# Patient Record
Sex: MALE | Race: WHITE | NOT HISPANIC OR LATINO | Employment: FULL TIME | URBAN - METROPOLITAN AREA
[De-identification: names, ages, dates, MRNs, and addresses within clinical notes are randomized per-mention and may not be internally consistent; named-entity substitution may affect disease eponyms.]

---

## 2018-11-26 ENCOUNTER — HOSPITAL ENCOUNTER (EMERGENCY)
Facility: HOSPITAL | Age: 43
Discharge: HOME | End: 2018-11-26
Attending: EMERGENCY MEDICINE | Admitting: EMERGENCY MEDICINE

## 2018-11-26 VITALS
OXYGEN SATURATION: 97 % | DIASTOLIC BLOOD PRESSURE: 81 MMHG | HEART RATE: 80 BPM | BODY MASS INDEX: 38.3 KG/M2 | TEMPERATURE: 98.1 F | RESPIRATION RATE: 16 BRPM | SYSTOLIC BLOOD PRESSURE: 123 MMHG | WEIGHT: 244 LBS | HEIGHT: 67 IN

## 2018-11-26 DIAGNOSIS — E87.6 HYPOKALEMIA DUE TO LOSS OF POTASSIUM: ICD-10-CM

## 2018-11-26 DIAGNOSIS — K52.9 ACUTE GASTROENTERITIS: Primary | ICD-10-CM

## 2018-11-26 LAB
ALBUMIN SERPL-MCNC: 4.1 G/DL (ref 3.5–5.2)
ALBUMIN/GLOB SERPL: 1.1 G/DL
ALP SERPL-CCNC: 70 U/L (ref 39–117)
ALT SERPL W P-5'-P-CCNC: 23 U/L (ref 1–41)
ANION GAP SERPL CALCULATED.3IONS-SCNC: 12.5 MMOL/L
AST SERPL-CCNC: 23 U/L (ref 1–40)
BACTERIA UR QL AUTO: ABNORMAL /HPF
BASOPHILS # BLD AUTO: 0.03 10*3/MM3 (ref 0–0.2)
BASOPHILS NFR BLD AUTO: 0.5 % (ref 0–1.5)
BILIRUB SERPL-MCNC: 1.4 MG/DL (ref 0.1–1.2)
BILIRUB UR QL STRIP: NEGATIVE
BUN BLD-MCNC: 16 MG/DL (ref 6–20)
BUN/CREAT SERPL: 15.8 (ref 7–25)
CALCIUM SPEC-SCNC: 9.4 MG/DL (ref 8.6–10.5)
CHLORIDE SERPL-SCNC: 98 MMOL/L (ref 98–107)
CLARITY UR: CLEAR
CO2 SERPL-SCNC: 26.5 MMOL/L (ref 22–29)
COLOR UR: ABNORMAL
CREAT BLD-MCNC: 1.01 MG/DL (ref 0.76–1.27)
DEPRECATED RDW RBC AUTO: 45.2 FL (ref 37–54)
EOSINOPHIL # BLD AUTO: 0.1 10*3/MM3 (ref 0–0.7)
EOSINOPHIL NFR BLD AUTO: 1.7 % (ref 0.3–6.2)
ERYTHROCYTE [DISTWIDTH] IN BLOOD BY AUTOMATED COUNT: 13.9 % (ref 11.5–14.5)
GFR SERPL CREATININE-BSD FRML MDRD: 81 ML/MIN/1.73
GLOBULIN UR ELPH-MCNC: 3.7 GM/DL
GLUCOSE BLD-MCNC: 103 MG/DL (ref 65–99)
GLUCOSE UR STRIP-MCNC: NEGATIVE MG/DL
HCT VFR BLD AUTO: 43.9 % (ref 40.4–52.2)
HGB BLD-MCNC: 15 G/DL (ref 13.7–17.6)
HGB UR QL STRIP.AUTO: ABNORMAL
HOLD SPECIMEN: NORMAL
HYALINE CASTS UR QL AUTO: ABNORMAL /LPF
IMM GRANULOCYTES # BLD: 0.02 10*3/MM3 (ref 0–0.03)
IMM GRANULOCYTES NFR BLD: 0.3 % (ref 0–0.5)
KETONES UR QL STRIP: ABNORMAL
LEUKOCYTE ESTERASE UR QL STRIP.AUTO: NEGATIVE
LYMPHOCYTES # BLD AUTO: 1.44 10*3/MM3 (ref 0.9–4.8)
LYMPHOCYTES NFR BLD AUTO: 24.9 % (ref 19.6–45.3)
MCH RBC QN AUTO: 30.2 PG (ref 27–32.7)
MCHC RBC AUTO-ENTMCNC: 34.2 G/DL (ref 32.6–36.4)
MCV RBC AUTO: 88.5 FL (ref 79.8–96.2)
MONOCYTES # BLD AUTO: 0.78 10*3/MM3 (ref 0.2–1.2)
MONOCYTES NFR BLD AUTO: 13.5 % (ref 5–12)
NEUTROPHILS # BLD AUTO: 3.42 10*3/MM3 (ref 1.9–8.1)
NEUTROPHILS NFR BLD AUTO: 59.1 % (ref 42.7–76)
NITRITE UR QL STRIP: NEGATIVE
NRBC BLD MANUAL-RTO: 0 /100 WBC (ref 0–0)
PH UR STRIP.AUTO: 6.5 [PH] (ref 5–8)
PLATELET # BLD AUTO: 194 10*3/MM3 (ref 140–500)
PMV BLD AUTO: 9.5 FL (ref 6–12)
POTASSIUM BLD-SCNC: 3 MMOL/L (ref 3.5–5.2)
PROT SERPL-MCNC: 7.8 G/DL (ref 6–8.5)
PROT UR QL STRIP: ABNORMAL
RBC # BLD AUTO: 4.96 10*6/MM3 (ref 4.6–6)
RBC # UR: ABNORMAL /HPF
REF LAB TEST METHOD: ABNORMAL
SODIUM BLD-SCNC: 137 MMOL/L (ref 136–145)
SP GR UR STRIP: 1.03 (ref 1–1.03)
SQUAMOUS #/AREA URNS HPF: ABNORMAL /HPF
UROBILINOGEN UR QL STRIP: ABNORMAL
WBC NRBC COR # BLD: 5.79 10*3/MM3 (ref 4.5–10.7)
WBC UR QL AUTO: ABNORMAL /HPF
WHOLE BLOOD HOLD SPECIMEN: NORMAL

## 2018-11-26 PROCEDURE — 81001 URINALYSIS AUTO W/SCOPE: CPT | Performed by: NURSE PRACTITIONER

## 2018-11-26 PROCEDURE — 96374 THER/PROPH/DIAG INJ IV PUSH: CPT

## 2018-11-26 PROCEDURE — 99284 EMERGENCY DEPT VISIT MOD MDM: CPT

## 2018-11-26 PROCEDURE — 80053 COMPREHEN METABOLIC PANEL: CPT | Performed by: NURSE PRACTITIONER

## 2018-11-26 PROCEDURE — 85025 COMPLETE CBC W/AUTO DIFF WBC: CPT | Performed by: NURSE PRACTITIONER

## 2018-11-26 PROCEDURE — 96361 HYDRATE IV INFUSION ADD-ON: CPT

## 2018-11-26 PROCEDURE — 25010000002 ONDANSETRON PER 1 MG: Performed by: NURSE PRACTITIONER

## 2018-11-26 RX ORDER — DICYCLOMINE HCL 20 MG
20 TABLET ORAL EVERY 6 HOURS PRN
Qty: 20 TABLET | Refills: 0 | Status: SHIPPED | OUTPATIENT
Start: 2018-11-26

## 2018-11-26 RX ORDER — ONDANSETRON 2 MG/ML
4 INJECTION INTRAMUSCULAR; INTRAVENOUS ONCE
Status: COMPLETED | OUTPATIENT
Start: 2018-11-26 | End: 2018-11-26

## 2018-11-26 RX ORDER — DULOXETIN HYDROCHLORIDE 60 MG/1
60 CAPSULE, DELAYED RELEASE ORAL DAILY
COMMUNITY

## 2018-11-26 RX ORDER — ONDANSETRON 4 MG/1
4 TABLET, ORALLY DISINTEGRATING ORAL EVERY 6 HOURS PRN
Qty: 10 TABLET | Refills: 0 | Status: SHIPPED | OUTPATIENT
Start: 2018-11-26

## 2018-11-26 RX ORDER — HYDROCODONE BITARTRATE AND ACETAMINOPHEN 10; 325 MG/1; MG/1
1 TABLET ORAL EVERY 4 HOURS PRN
COMMUNITY

## 2018-11-26 RX ORDER — POTASSIUM CHLORIDE 750 MG/1
40 CAPSULE, EXTENDED RELEASE ORAL ONCE
Status: COMPLETED | OUTPATIENT
Start: 2018-11-26 | End: 2018-11-26

## 2018-11-26 RX ORDER — CIPROFLOXACIN 500 MG/1
500 TABLET, FILM COATED ORAL 2 TIMES DAILY
Qty: 10 TABLET | Refills: 0 | Status: SHIPPED | OUTPATIENT
Start: 2018-11-26

## 2018-11-26 RX ORDER — OMEPRAZOLE 20 MG/1
20 CAPSULE, DELAYED RELEASE ORAL DAILY
COMMUNITY

## 2018-11-26 RX ORDER — SODIUM CHLORIDE 0.9 % (FLUSH) 0.9 %
10 SYRINGE (ML) INJECTION AS NEEDED
Status: DISCONTINUED | OUTPATIENT
Start: 2018-11-26 | End: 2018-11-26 | Stop reason: HOSPADM

## 2018-11-26 RX ORDER — ZOLPIDEM TARTRATE 10 MG/1
10 TABLET ORAL NIGHTLY PRN
COMMUNITY

## 2018-11-26 RX ADMIN — SODIUM CHLORIDE 1000 ML: 9 INJECTION, SOLUTION INTRAVENOUS at 14:53

## 2018-11-26 RX ADMIN — ONDANSETRON 4 MG: 2 INJECTION INTRAMUSCULAR; INTRAVENOUS at 14:52

## 2018-11-26 RX ADMIN — POTASSIUM CHLORIDE 40 MEQ: 750 CAPSULE, EXTENDED RELEASE ORAL at 15:55

## 2018-11-26 NOTE — DISCHARGE INSTRUCTIONS
Take medications as directed and drink plenty of fluids.  Do follow up with your family doctor.  Please return to the ED if you develop increasing pain, fever or if you develop bloody diarrhea.

## 2018-11-26 NOTE — ED NOTES
"Pt reports that since thanksgiving night he has had abdominal pain and diarrhea. Pt reports for 2 days he had a fever of 102, but denies fever sine then. Pt states he only has abdominal pain right before he has an episode of diarrhea. Abdominal pain is mainly RLQ and right groin. Pt reports frequent bowel movements. He states \"its about every hour and has been since Thursday\". Pt is AAOX4. Vital signs stable this RN placed pt on monitor since pts potassium is 3.0.     Pt also reports he has sarcoid and RA.     Sharmila Patel, RN  11/26/18 0571    "

## 2018-11-26 NOTE — ED PROVIDER NOTES
EMERGENCY DEPARTMENT ENCOUNTER    CHIEF COMPLAINT  Chief Complaint: V/D  History given by: Pt  History limited by: Nothing  Room Number: 11/11  PMD: Rudolph Witt MD      HPI:  Pt is a 43 y.o. male who presents complaining of vomiting and diarrhea (around every hour) which began 5 days ago, which improved over the weekend but worsened today. The pt confirms fever (Tmax 102 2 days ago), mild sore throat, and lower abd cramping (tenesmus), and denies new muscle aches, cough, SOA, earache, weakness, numbness, hematemesis, or black/bloody stool. The pt has not been around anyone sick recently. The pt has not been on antibiotics or steroids recently. The pt's only new medication has been lisinopril.     Duration:  5 days  Onset: Gradual  Timing: Intemittent  Location: GI  Quality: Every hour  Intensity/Severity: Moderate  Progression: No change  Associated Symptoms: Mild sore throat, fever, lower abd cramping  Aggravating Factors: Unknown  Alleviating Factors: Unknown  Treatment before arrival: None    PAST MEDICAL HISTORY  Active Ambulatory Problems     Diagnosis Date Noted   • No Active Ambulatory Problems     Resolved Ambulatory Problems     Diagnosis Date Noted   • No Resolved Ambulatory Problems     No Additional Past Medical History       PAST SURGICAL HISTORY  No past surgical history on file.    FAMILY HISTORY  No family history on file.    SOCIAL HISTORY  Social History     Socioeconomic History   • Marital status:      Spouse name: Not on file   • Number of children: Not on file   • Years of education: Not on file   • Highest education level: Not on file   Social Needs   • Financial resource strain: Not on file   • Food insecurity - worry: Not on file   • Food insecurity - inability: Not on file   • Transportation needs - medical: Not on file   • Transportation needs - non-medical: Not on file   Occupational History   • Not on file   Tobacco Use   • Smoking status: Not on file   Substance and Sexual  Activity   • Alcohol use: Not on file   • Drug use: Not on file   • Sexual activity: Not on file   Other Topics Concern   • Not on file   Social History Narrative   • Not on file       ALLERGIES  Patient has no known allergies.    REVIEW OF SYSTEMS  Review of Systems   Constitutional: Positive for fever. Negative for activity change and appetite change.   HENT: Positive for sore throat. Negative for congestion.    Eyes: Negative.    Respiratory: Negative for cough and shortness of breath.    Cardiovascular: Negative for chest pain and leg swelling.   Gastrointestinal: Positive for abdominal pain (Cramping), diarrhea and vomiting. Negative for blood in stool.   Endocrine: Negative.    Genitourinary: Negative for decreased urine volume and dysuria.   Musculoskeletal: Negative for neck pain.   Skin: Negative for rash and wound.   Allergic/Immunologic: Negative.    Neurological: Negative for weakness, numbness and headaches.   Hematological: Negative.    Psychiatric/Behavioral: Negative.    All other systems reviewed and are negative.      PHYSICAL EXAM  ED Triage Vitals   Temp Heart Rate Resp BP SpO2   11/26/18 1255 11/26/18 1255 11/26/18 1318 11/26/18 1318 11/26/18 1255   97.1 °F (36.2 °C) 110 15 137/81 100 %      Temp src Heart Rate Source Patient Position BP Location FiO2 (%)   11/26/18 1255 -- 11/26/18 1318 11/26/18 1318 --   Tympanic  Sitting Right arm        Physical Exam   Constitutional: He is oriented to person, place, and time. No distress.   HENT:   Head: Normocephalic and atraumatic.   Postnasal drip   Eyes: EOM are normal. Pupils are equal, round, and reactive to light.   Neck: Normal range of motion. Neck supple.   Cardiovascular: Normal rate, regular rhythm and normal heart sounds.   No murmur heard.  Pulmonary/Chest: Effort normal and breath sounds normal. No respiratory distress.   Abdominal: Soft. Bowel sounds are normal. He exhibits no distension. There is no tenderness. There is no rebound, no  guarding and no CVA tenderness.   Musculoskeletal: Normal range of motion. He exhibits no edema.   Neurological: He is alert and oriented to person, place, and time. He has normal sensation and normal strength.   Skin: Skin is warm and dry.   Psychiatric: Mood and affect normal.   Nursing note and vitals reviewed.      LAB RESULTS  Lab Results (last 24 hours)     Procedure Component Value Units Date/Time    CBC & Differential [[id number]] Collected:  11/26/18 1320    Specimen:  Blood Updated:  11/26/18 1404    Narrative:       The following orders were created for panel order CBC & Differential.  Procedure                               Abnormality         Status                     ---------                               -----------         ------                     Scan Slide[[id number]]                                                                  CBC Auto Differential[[id number]]        Abnormal            Final result                 Please view results for these tests on the individual orders.    Comprehensive Metabolic Panel [[id number]]  (Abnormal) Collected:  11/26/18 1320    Specimen:  Blood Updated:  11/26/18 1356     Glucose 103 mg/dL      BUN 16 mg/dL      Creatinine 1.01 mg/dL      Sodium 137 mmol/L      Potassium 3.0 mmol/L      Chloride 98 mmol/L      CO2 26.5 mmol/L      Calcium 9.4 mg/dL      Total Protein 7.8 g/dL      Albumin 4.10 g/dL      ALT (SGPT) 23 U/L      AST (SGOT) 23 U/L      Alkaline Phosphatase 70 U/L      Total Bilirubin 1.4 mg/dL      eGFR Non African Amer 81 mL/min/1.73      Globulin 3.7 gm/dL      A/G Ratio 1.1 g/dL      BUN/Creatinine Ratio 15.8     Anion Gap 12.5 mmol/L     CBC Auto Differential [[id number]]  (Abnormal) Collected:  11/26/18 1320    Specimen:  Blood Updated:  11/26/18 1404     WBC 5.79 10*3/mm3      RBC 4.96 10*6/mm3      Hemoglobin 15.0 g/dL      Hematocrit 43.9 %      MCV 88.5 fL      MCH 30.2 pg      MCHC 34.2 g/dL      RDW 13.9 %      RDW-SD 45.2 fl      MPV  9.5 fL      Platelets 194 10*3/mm3      Neutrophil % 59.1 %      Lymphocyte % 24.9 %      Monocyte % 13.5 %      Eosinophil % 1.7 %      Basophil % 0.5 %      Immature Grans % 0.3 %      Neutrophils, Absolute 3.42 10*3/mm3      Lymphocytes, Absolute 1.44 10*3/mm3      Monocytes, Absolute 0.78 10*3/mm3      Eosinophils, Absolute 0.10 10*3/mm3      Basophils, Absolute 0.03 10*3/mm3      Immature Grans, Absolute 0.02 10*3/mm3      nRBC 0.0 /100 WBC     Urinalysis With Microscopic If Indicated (No Culture) - Urine, Clean Catch [[id number]]  (Abnormal) Collected:  11/26/18 1325    Specimen:  Urine, Clean Catch Updated:  11/26/18 1339     Color, UA Dark Yellow     Appearance, UA Clear     pH, UA 6.5     Specific Gravity, UA 1.026     Glucose, UA Negative     Ketones, UA Trace     Bilirubin, UA Negative     Blood, UA Trace     Protein,  mg/dL (2+)     Leuk Esterase, UA Negative     Nitrite, UA Negative     Urobilinogen, UA 0.2 E.U./dL    Urinalysis, Microscopic Only - Urine, Clean Catch [[id number]]  (Abnormal) Collected:  11/26/18 1325    Specimen:  Urine, Clean Catch Updated:  11/26/18 1339     RBC, UA 3-5 /HPF      WBC, UA 0-2 /HPF      Bacteria, UA None Seen /HPF      Squamous Epithelial Cells, UA 0-2 /HPF      Hyaline Casts, UA 7-12 /LPF      Methodology Automated Microscopy          I ordered the above labs and reviewed the results      PROCEDURES  Procedures      PROGRESS AND CONSULTS  ED Course as of Nov 26 1537   Mon Nov 26, 2018   1315 Vomiting and diarrhea since last Thursday also fever & chills last week.    [MS]      ED Course User Index  [MS] Amelia Cordova, APRN     1507 Discussed the pt's lab results including low potassium and normal WBC. Discussed the plan to give the pt more fluids. Discussed the plan to discharge the pt with antibiotics. The pt agrees with the plan and all questions were addressed.       MEDICAL DECISION MAKING  Results were reviewed/discussed with the patient and they were  also made aware of online access. Pt also made aware that some labs, such as cultures, will not be resulted during ER visit and follow up with PMD is necessary.     MDM  Number of Diagnoses or Management Options     Amount and/or Complexity of Data Reviewed  Clinical lab tests: ordered and reviewed (Potassium: 3.0)  Decide to obtain previous medical records or to obtain history from someone other than the patient: yes  Review and summarize past medical records: yes    Patient Progress  Patient progress: improved         DIAGNOSIS  Final diagnoses:   Acute gastroenteritis       DISPOSITION  Disposition: Discharged.    Patient discharged in stable condition.    Reviewed implications of results, diagnosis, meds, responsibility to follow up, warning signs and symptoms of possible worsening, potential complications and reasons to return to ER, including new or worsening symptoms.    Patient/Family voiced understanding of above instructions.    Discussed plan for discharge, as there is no emergent indication for admission.  Pt/family is agreeable and understands need for follow up and repeat testing.  Pt is aware that discharge does not mean that nothing is wrong but it indicates no emergency is present and they must continue care with follow-up as given below or physician of their choice.     FOLLOW-UP  Rudolph Witt MD  [address] [facility] [place] [address] [phone]    Schedule an appointment as soon as possible for a visit            Medication List      New Prescriptions    ciprofloxacin 500 MG tablet  Commonly known as:  CIPRO  Take 1 tablet by mouth 2 (Two) Times a Day.     dicyclomine 20 MG tablet  Commonly known as:  BENTYL  Take 1 tablet by mouth Every 6 (Six) Hours As Needed (cramping).     ondansetron ODT 4 MG disintegrating tablet  Commonly known as:  ZOFRAN-ODT  Take 1 tablet by mouth Every 6 (Six) Hours As Needed for Nausea.            Latest Documented Vital Signs:  As of 3:37 PM  BP- 133/79 HR- 86  Temp- 98.1 °F (36.7 °C) (Oral) O2 sat- 98%    --  Documentation assistance provided by nicolasa Rose for Dr. Watts.  Information recorded by the scribe was done at my direction and has been verified and validated by me.     Jeimy Rose  11/26/18 5341       Rafiq Watts MD  11/26/18 8278

## 2019-01-11 ENCOUNTER — HOSPITAL ENCOUNTER (EMERGENCY)
Facility: HOSPITAL | Age: 44
Discharge: HOME | End: 2019-01-11
Attending: EMERGENCY MEDICINE | Admitting: EMERGENCY MEDICINE

## 2019-01-11 ENCOUNTER — APPOINTMENT (OUTPATIENT)
Dept: GENERAL RADIOLOGY | Facility: HOSPITAL | Age: 44
End: 2019-01-11

## 2019-01-11 VITALS
DIASTOLIC BLOOD PRESSURE: 92 MMHG | BODY MASS INDEX: 37.67 KG/M2 | HEART RATE: 90 BPM | OXYGEN SATURATION: 92 % | HEIGHT: 67 IN | RESPIRATION RATE: 18 BRPM | SYSTOLIC BLOOD PRESSURE: 146 MMHG | TEMPERATURE: 98.2 F | WEIGHT: 240 LBS

## 2019-01-11 DIAGNOSIS — S62.362A CLOSED NONDISPLACED FRACTURE OF NECK OF THIRD METACARPAL BONE OF RIGHT HAND, INITIAL ENCOUNTER: ICD-10-CM

## 2019-01-11 DIAGNOSIS — S67.21XA CRUSHING INJURY OF RIGHT HAND, INITIAL ENCOUNTER: Primary | ICD-10-CM

## 2019-01-11 DIAGNOSIS — S62.364A CLOSED NONDISPLACED FRACTURE OF NECK OF FOURTH METACARPAL BONE OF RIGHT HAND, INITIAL ENCOUNTER: ICD-10-CM

## 2019-01-11 DIAGNOSIS — S62.360A CLOSED NONDISPLACED FRACTURE OF NECK OF SECOND METACARPAL BONE OF RIGHT HAND, INITIAL ENCOUNTER: ICD-10-CM

## 2019-01-11 PROCEDURE — 25010000002 TDAP 5-2.5-18.5 LF-MCG/0.5 SUSPENSION: Performed by: EMERGENCY MEDICINE

## 2019-01-11 PROCEDURE — 73130 X-RAY EXAM OF HAND: CPT

## 2019-01-11 PROCEDURE — 96374 THER/PROPH/DIAG INJ IV PUSH: CPT

## 2019-01-11 PROCEDURE — 90471 IMMUNIZATION ADMIN: CPT | Performed by: EMERGENCY MEDICINE

## 2019-01-11 PROCEDURE — 90715 TDAP VACCINE 7 YRS/> IM: CPT | Performed by: EMERGENCY MEDICINE

## 2019-01-11 PROCEDURE — 25010000002 HYDROMORPHONE 1 MG/ML SOLUTION: Performed by: EMERGENCY MEDICINE

## 2019-01-11 PROCEDURE — 99283 EMERGENCY DEPT VISIT LOW MDM: CPT

## 2019-01-11 PROCEDURE — 25010000002 ONDANSETRON PER 1 MG: Performed by: EMERGENCY MEDICINE

## 2019-01-11 PROCEDURE — 96376 TX/PRO/DX INJ SAME DRUG ADON: CPT

## 2019-01-11 PROCEDURE — 25010000002 HYDROMORPHONE PER 4 MG: Performed by: EMERGENCY MEDICINE

## 2019-01-11 PROCEDURE — 96375 TX/PRO/DX INJ NEW DRUG ADDON: CPT

## 2019-01-11 RX ORDER — SODIUM CHLORIDE 0.9 % (FLUSH) 0.9 %
10 SYRINGE (ML) INJECTION AS NEEDED
Status: DISCONTINUED | OUTPATIENT
Start: 2019-01-11 | End: 2019-01-11 | Stop reason: HOSPADM

## 2019-01-11 RX ORDER — HYDROMORPHONE HYDROCHLORIDE 1 MG/ML
0.5 INJECTION, SOLUTION INTRAMUSCULAR; INTRAVENOUS; SUBCUTANEOUS ONCE
Status: COMPLETED | OUTPATIENT
Start: 2019-01-11 | End: 2019-01-11

## 2019-01-11 RX ORDER — ONDANSETRON 2 MG/ML
4 INJECTION INTRAMUSCULAR; INTRAVENOUS ONCE
Status: COMPLETED | OUTPATIENT
Start: 2019-01-11 | End: 2019-01-11

## 2019-01-11 RX ADMIN — HYDROMORPHONE HYDROCHLORIDE 1 MG: 1 INJECTION, SOLUTION INTRAMUSCULAR; INTRAVENOUS; SUBCUTANEOUS at 10:36

## 2019-01-11 RX ADMIN — HYDROMORPHONE HYDROCHLORIDE 0.5 MG: 1 INJECTION, SOLUTION INTRAMUSCULAR; INTRAVENOUS; SUBCUTANEOUS at 08:53

## 2019-01-11 RX ADMIN — TETANUS TOXOID, REDUCED DIPHTHERIA TOXOID AND ACELLULAR PERTUSSIS VACCINE, ADSORBED 0.5 ML: 5; 2.5; 8; 8; 2.5 SUSPENSION INTRAMUSCULAR at 09:06

## 2019-01-11 RX ADMIN — ONDANSETRON 4 MG: 2 INJECTION INTRAMUSCULAR; INTRAVENOUS at 08:53

## 2019-01-11 NOTE — DISCHARGE INSTRUCTIONS
Go directly to [facility] office on the 7th floor of [facility].

## 2019-01-11 NOTE — ED PROVIDER NOTES
" EMERGENCY DEPARTMENT ENCOUNTER    Room Number:  07/07  PCP: Rudolph Witt MD  Historian: Patient, Friend      HPI  Chief Complaint: Hand Injury  Context: Micah Hutchison Jr. is a 43 y.o. male who is right hand dominant. Pt states that he has h/o rheumatoid arthritis and sarcoidosis for which pt takes pain medicine at baseline. Pt reports that while pt was at work at about 8:15 AM today, pt sustained crush injury of the right hand from a glass cutting machine that pt was using. Pt denies sustaining any other injuries, chest pain, and dyspnea. Tetanus injection status: Unknown. There are no other complaints at this time.       Pain Location: Right hand  Radiation: None  Character: \"crush injury\"  Duration: Injury was sustained at about 08:15 AM today  Severity: Moderate  Progression: Unchanged  Aggravating Factors: Nothing  Alleviating Factors: Nothing        PAST MEDICAL HISTORY  Active Ambulatory Problems     Diagnosis Date Noted   • No Active Ambulatory Problems     Resolved Ambulatory Problems     Diagnosis Date Noted   • No Resolved Ambulatory Problems     Past Medical History:   Diagnosis Date   • Colitis    • GERD (gastroesophageal reflux disease)    • Hypertension    • Kidney stones    • Rheumatoid arthritis (CMS/HCC)    • Sarcoid          PAST SURGICAL HISTORY  Past Surgical History:   Procedure Laterality Date   • CHOLECYSTECTOMY     • FEMUR FRACTURE SURGERY     • KIDNEY STONE SURGERY           FAMILY HISTORY  History reviewed. No pertinent family history.      SOCIAL HISTORY  Social History     Socioeconomic History   • Marital status:      Spouse name: Not on file   • Number of children: Not on file   • Years of education: Not on file   • Highest education level: Not on file   Social Needs   • Financial resource strain: Not on file   • Food insecurity - worry: Not on file   • Food insecurity - inability: Not on file   • Transportation needs - medical: Not on file   • Transportation needs - " non-medical: Not on file   Occupational History   • Not on file   Tobacco Use   • Smoking status: Current Every Day Smoker     Types: Cigarettes   • Smokeless tobacco: Current User     Types: Chew   Substance and Sexual Activity   • Alcohol use: Yes     Comment: occasional    • Drug use: No   • Sexual activity: Defer   Other Topics Concern   • Not on file   Social History Narrative   • Not on file         ALLERGIES  Patient has no known allergies.        REVIEW OF SYSTEMS  Review of Systems   Constitutional: Negative.    Eyes: Negative for visual disturbance.   Respiratory: Negative for shortness of breath.    Cardiovascular: Negative for chest pain.   Gastrointestinal: Negative for abdominal pain, nausea and vomiting.   Musculoskeletal: Negative for neck pain.        Right hand injury   Skin: Negative for rash.   Neurological: Negative for syncope and headaches.   Psychiatric/Behavioral: Negative.    All other systems reviewed and are negative.           PHYSICAL EXAM  ED Triage Vitals   Temp Heart Rate Resp BP SpO2   01/11/19 0845 01/11/19 0845 01/11/19 0845 01/11/19 0846 01/11/19 0848   98.2 °F (36.8 °C) 78 18 127/98 97 %      Temp src Heart Rate Source Patient Position BP Location FiO2 (%)   01/11/19 0845 -- -- -- --   Tympanic             Physical Exam   Constitutional: He is oriented to person, place, and time. No distress.   Cardiovascular:   Brisk cap refill of the right hand.    Pulmonary/Chest: Effort normal. No respiratory distress. He exhibits no tenderness.   Musculoskeletal:   There is a small, superficial laceration to the palmar surface of the right hand. There is a large, fluctuant hematoma on the palmar aspect of the right hand. There is generalized swelling of pt's right hand. Sensation is intact to light touch in radial, ulnar, and median nerve distributions. Radial pulses are 2+ and equal bilaterally.     Neurological: He is alert and oriented to person, place, and time. He has normal sensation  and normal strength.   Skin: Skin is warm.   Psychiatric: Mood and affect normal.   Nursing note and vitals reviewed.          RADIOLOGY  XR Hand 3+ View Right   Final Result   1. Fractures of the distal aspect of the second third and   fourth metacarpals. The fractures do not appear to involve the   metacarpophalangeal joints.       This report was finalized on 1/11/2019 9:49 AM by Dr. Micah West M.D.               Ordered the above noted radiological studies. Reviewed by me in PACS.            PROCEDURES  Splint - Cast - Strapping  Date/Time: 1/11/2019 11:05 AM  Performed by: Frankie Sorensen MD  Authorized by: Frankie Sorensen MD     Consent:     Consent obtained:  Verbal    Consent given by:  Patient    Risks discussed:  Discoloration, numbness, pain and swelling  Pre-procedure details:     Sensation:  Normal  Procedure details:     Laterality:  Right    Location:  Hand    Hand:  R hand    Strapping: no      Splint type:  Sugar tong    Supplies:  Ortho-Glass and cotton padding  Post-procedure details:     Pain:  Improved    Sensation:  Normal    Patient tolerance of procedure:  Tolerated well, no immediate complications              MEDICATIONS GIVEN IN ER  Medications   sodium chloride 0.9 % flush 10 mL (not administered)   HYDROmorphone (DILAUDID) injection 0.5 mg (0.5 mg Intravenous Given 1/11/19 0853)   ondansetron (ZOFRAN) injection 4 mg (4 mg Intravenous Given 1/11/19 0853)   Tdap (BOOSTRIX) injection 0.5 mL (0.5 mL Intramuscular Given 1/11/19 0906)   HYDROmorphone (DILAUDID) injection 1 mg (1 mg Intravenous Given 1/11/19 1036)             PROGRESS AND CONSULTS  ED Course as of Jan 11 1249 Fri Jan 11, 2019   1111 11:11 AM  Patient with right hand injury.  Crush injury at work.  Has fracture of 2,3,4 metacarpels.  Small superficial laceration.  Discussed with Dr. Oconnor.  He will see in clinic at [facility] today.  Has pain meds.  [SL]      ED Course User Index  [SL] Frankie Sorensen MD        8:51 AM:  Right hand X-Ray ordered for further evaluation. Dilaudid and Zofran ordered to treat for hand pain. T-Dap injection ordered for pt.     10:13 AM:  Rechecked pt. Pt's family is currently at pt's bedside. Informed pt and family that pt's right hand X-Ray shows fractures of the distal aspect of the second, third, and fourth metacarpals. Will discuss further course of care with the hand surgeon - Pt is requesting [facility].     10:14 AM:  Placed call to the hand surgeon to discuss further course of care.     10:34 AM:  Discussed the case with on-call provider for [facility]. They will have Dr. Oconnor, hand surgeon, call me back.    Dilaudid ordered to treat for pt's hand pain.     10:41 AM:  Discussed the case with Dr. Oconnor, hand surgeon. He would like pt discharged from the ER. After pt is discharged from the ER, he will f/u with pt in the Outpatient Care Center (7th floor) at [facility] -> Pt was informed of my d/w Dr. Oconnor, who will f/u with pt immediately after pt is discharged from the ER. Pt's wife says that she will drive pt to the Outpatient Care Center.     Pt's RUE will be applied in a splint prior to pt's discharge from the ER.           MEDICAL DECISION MAKING      MDM  Number of Diagnoses or Management Options     Amount and/or Complexity of Data Reviewed  Tests in the radiology section of CPT®: ordered and reviewed (Right hand X-Ray:  1. Fractures of the distal aspect of the second third and  fourth metacarpals. The fractures do not appear to involve the  metacarpophalangeal joints.)  Discuss the patient with other providers: yes (Discussed the case with Dr. Oconnor, hand surgeon.)    Patient Progress  Patient progress: stable             DIAGNOSIS  Final diagnoses:   Crushing injury of right hand, initial encounter   Closed nondisplaced fracture of neck of second metacarpal bone of right hand, initial encounter   Closed nondisplaced fracture of neck of third  metacarpal bone of right hand, initial encounter   Closed nondisplaced fracture of neck of fourth metacarpal bone of right hand, initial encounter           DISPOSITION  Pt discharged.    DISCHARGE    Patient discharged in stable condition.    Reviewed implications of results, diagnosis, meds, responsibility to follow up, warning signs and symptoms of possible worsening, potential complications and reasons to return to ER.    Patient/Family voiced understanding of above instructions.    Discussed plan for discharge, as there is no emergent indication for admission. Pt/family is agreeable and understands need for follow up and repeat testing. Pt is aware that discharge does not mean that nothing is wrong but it indicates no emergency is present that requires admission and they must continue care with follow-up as given below or physician of their choice.     FOLLOW-UP  Rudolph Witt MD  [address] [facility] [place] [facility]  [phone]    Schedule an appointment as soon as possible for a visit           Latest Documented Vital Signs:  As of 11:50 AM  BP- 146/92 HR- 90 Temp- 98.2 °F (36.8 °C) (Tympanic) O2 sat- 92%        --  Documentation assistance provided by nicolasa Spears for Dr. Edu MD.  Information recorded by the nicolasa was done at my direction and has been verified and validated by me.             Kylah Spears  01/11/19 6398       Frankie Sorensen MD  01/11/19 8066

## 2019-01-11 NOTE — ED TRIAGE NOTES
Pt reports his right hand got caught in a machine at work. Severe swelling and bruising noted to right hand.

## 2025-04-22 ENCOUNTER — TRANSCRIBE ORDERS (OUTPATIENT)
Dept: ADMINISTRATIVE | Facility: HOSPITAL | Age: 50
End: 2025-04-22
Payer: OTHER MISCELLANEOUS

## 2025-04-22 DIAGNOSIS — Z13.6 SCREENING FOR CARDIOVASCULAR CONDITION: Primary | ICD-10-CM
